# Patient Record
Sex: MALE | Race: WHITE | Employment: FULL TIME | URBAN - METROPOLITAN AREA
[De-identification: names, ages, dates, MRNs, and addresses within clinical notes are randomized per-mention and may not be internally consistent; named-entity substitution may affect disease eponyms.]

---

## 2021-04-08 ENCOUNTER — HOSPITAL ENCOUNTER (OUTPATIENT)
Dept: GENERAL RADIOLOGY | Age: 56
Discharge: HOME | End: 2021-04-08
Payer: COMMERCIAL

## 2021-04-08 ENCOUNTER — TRANSCRIBE ORDER (OUTPATIENT)
Dept: REGISTRATION | Age: 56
End: 2021-04-08

## 2021-04-08 DIAGNOSIS — R07.89 OTHER CHEST PAIN: ICD-10-CM

## 2021-04-08 DIAGNOSIS — R94.39 ABNORMAL BALLISTOCARDIOGRAM: ICD-10-CM

## 2021-04-08 DIAGNOSIS — R94.39 ABNORMAL BALLISTOCARDIOGRAM: Primary | ICD-10-CM

## 2021-04-08 PROCEDURE — 71046 X-RAY EXAM CHEST 2 VIEWS: CPT

## 2021-04-09 ENCOUNTER — APPOINTMENT (OUTPATIENT)
Dept: VASCULAR SURGERY | Age: 56
End: 2021-04-09
Attending: PHYSICIAN ASSISTANT
Payer: COMMERCIAL

## 2021-04-09 ENCOUNTER — APPOINTMENT (OUTPATIENT)
Dept: NON INVASIVE DIAGNOSTICS | Age: 56
End: 2021-04-09
Attending: PHYSICIAN ASSISTANT
Payer: COMMERCIAL

## 2021-04-09 ENCOUNTER — APPOINTMENT (OUTPATIENT)
Dept: GENERAL RADIOLOGY | Age: 56
End: 2021-04-09
Attending: PHYSICIAN ASSISTANT
Payer: COMMERCIAL

## 2021-04-09 ENCOUNTER — HOSPITAL ENCOUNTER (OUTPATIENT)
Age: 56
Discharge: HOME | End: 2021-04-09
Attending: INTERNAL MEDICINE | Admitting: INTERNAL MEDICINE
Payer: COMMERCIAL

## 2021-04-09 VITALS
WEIGHT: 197.97 LBS | BODY MASS INDEX: 28.34 KG/M2 | HEIGHT: 70 IN | OXYGEN SATURATION: 96 % | RESPIRATION RATE: 18 BRPM | TEMPERATURE: 98.6 F | SYSTOLIC BLOOD PRESSURE: 128 MMHG | DIASTOLIC BLOOD PRESSURE: 75 MMHG | HEART RATE: 82 BPM

## 2021-04-09 DIAGNOSIS — Z01.810 PREOP CARDIOVASCULAR EXAM: ICD-10-CM

## 2021-04-09 DIAGNOSIS — R07.9 CHEST PAIN, UNSPECIFIED TYPE: ICD-10-CM

## 2021-04-09 DIAGNOSIS — I65.23 BILATERAL CAROTID ARTERY STENOSIS: ICD-10-CM

## 2021-04-09 PROBLEM — R94.39 CARDIOVASCULAR STRESS TEST ABNORMAL: Status: ACTIVE | Noted: 2021-04-09

## 2021-04-09 PROBLEM — I25.118 CORONARY ARTERY DISEASE OF NATIVE ARTERY OF NATIVE HEART WITH STABLE ANGINA PECTORIS (HCC): Status: ACTIVE | Noted: 2021-04-09

## 2021-04-09 LAB
ALBUMIN SERPL-MCNC: 3.7 G/DL (ref 3.5–5)
ALBUMIN/GLOB SERPL: 1.4 {RATIO} (ref 1.1–2.2)
ALP SERPL-CCNC: 34 U/L (ref 45–117)
ALT SERPL-CCNC: 38 U/L (ref 12–78)
ANION GAP SERPL CALC-SCNC: 5 MMOL/L (ref 5–15)
ANION GAP SERPL CALC-SCNC: 6 MMOL/L (ref 5–15)
APPEARANCE UR: CLEAR
ARTERIAL PATENCY WRIST A: YES
AST SERPL-CCNC: 14 U/L (ref 15–37)
BACTERIA URNS QL MICRO: NEGATIVE /HPF
BASE EXCESS BLD CALC-SCNC: 0 MMOL/L
BASOPHILS # BLD: 0.1 K/UL (ref 0–0.1)
BASOPHILS NFR BLD: 1 % (ref 0–1)
BDY SITE: ABNORMAL
BILIRUB SERPL-MCNC: 0.6 MG/DL (ref 0.2–1)
BILIRUB UR QL: NEGATIVE
BUN SERPL-MCNC: 15 MG/DL (ref 6–20)
BUN SERPL-MCNC: 18 MG/DL (ref 6–20)
BUN/CREAT SERPL: 15 (ref 12–20)
BUN/CREAT SERPL: 21 (ref 12–20)
CA-I BLD-SCNC: 1.3 MMOL/L (ref 1.12–1.32)
CALCIUM SERPL-MCNC: 8.5 MG/DL (ref 8.5–10.1)
CALCIUM SERPL-MCNC: 8.9 MG/DL (ref 8.5–10.1)
CHLORIDE SERPL-SCNC: 108 MMOL/L (ref 97–108)
CHLORIDE SERPL-SCNC: 108 MMOL/L (ref 97–108)
CO2 SERPL-SCNC: 26 MMOL/L (ref 21–32)
CO2 SERPL-SCNC: 28 MMOL/L (ref 21–32)
COLOR UR: ABNORMAL
COMMENT, HOLDF: NORMAL
CREAT SERPL-MCNC: 0.87 MG/DL (ref 0.7–1.3)
CREAT SERPL-MCNC: 0.97 MG/DL (ref 0.7–1.3)
DIFFERENTIAL METHOD BLD: ABNORMAL
ECHO AO ASC DIAM: 3.55 CM
ECHO AV AREA PEAK VELOCITY: 3.3 CM2
ECHO AV AREA/BSA PEAK VELOCITY: 1.6 CM2/M2
ECHO AV PEAK GRADIENT: 4.92 MMHG
ECHO AV PEAK VELOCITY: 110.94 CM/S
ECHO LA TO AORTIC ROOT RATIO: 1.09
ECHO LV E' LATERAL VELOCITY: 12.16 CM/S
ECHO LV E' SEPTAL VELOCITY: 6.59 CM/S
ECHO LV INTERNAL DIMENSION DIASTOLIC: 4.23 CM (ref 4.2–5.9)
ECHO LV INTERNAL DIMENSION SYSTOLIC: 2.69 CM
ECHO LV IVSD: 0.93 CM (ref 0.6–1)
ECHO LV MASS 2D: 121.9 G (ref 88–224)
ECHO LV MASS INDEX 2D: 58.6 G/M2 (ref 49–115)
ECHO LV POSTERIOR WALL DIASTOLIC: 0.89 CM (ref 0.6–1)
ECHO LVOT DIAM: 2.29 CM
ECHO LVOT PEAK GRADIENT: 3.19 MMHG
ECHO LVOT PEAK VELOCITY: 89.35 CM/S
ECHO MV A VELOCITY: 79.36 CM/S
ECHO MV E DECELERATION TIME (DT): 280.37 MS
ECHO MV E VELOCITY: 64.16 CM/S
ECHO MV E/A RATIO: 0.81
ECHO MV E/E' LATERAL: 5.28
ECHO MV E/E' RATIO (AVERAGED): 7.51
ECHO MV E/E' SEPTAL: 9.74
ECHO PV PEAK INSTANTANEOUS GRADIENT SYSTOLIC: 1.44 MMHG
ECHO PV REGURGITANT MAX VELOCITY: 60.03 CM/S
ECHO RV INTERNAL DIMENSION: 3.19 CM
EOSINOPHIL # BLD: 0 K/UL (ref 0–0.4)
EOSINOPHIL NFR BLD: 0 % (ref 0–7)
EPITH CASTS URNS QL MICRO: ABNORMAL /LPF
ERYTHROCYTE [DISTWIDTH] IN BLOOD BY AUTOMATED COUNT: 12.4 % (ref 11.5–14.5)
GAS FLOW.O2 O2 DELIVERY SYS: ABNORMAL L/MIN
GLOBULIN SER CALC-MCNC: 2.7 G/DL (ref 2–4)
GLUCOSE SERPL-MCNC: 113 MG/DL (ref 65–100)
GLUCOSE SERPL-MCNC: 188 MG/DL (ref 65–100)
GLUCOSE UR STRIP.AUTO-MCNC: 100 MG/DL
HCO3 BLD-SCNC: 24.6 MMOL/L (ref 22–26)
HCT VFR BLD AUTO: 43.2 % (ref 36.6–50.3)
HGB BLD-MCNC: 14.9 G/DL (ref 12.1–17)
HGB UR QL STRIP: NEGATIVE
HYALINE CASTS URNS QL MICRO: ABNORMAL /LPF (ref 0–5)
IMM GRANULOCYTES # BLD AUTO: 0.1 K/UL (ref 0–0.04)
IMM GRANULOCYTES NFR BLD AUTO: 1 % (ref 0–0.5)
KETONES UR QL STRIP.AUTO: NEGATIVE MG/DL
LEFT CCA DIST DIAS: 27.8 CM/S
LEFT CCA DIST SYS: 77.5 CM/S
LEFT CCA PROX DIAS: 27.8 CM/S
LEFT CCA PROX SYS: 88.4 CM/S
LEFT ECA DIAS: 13.8 CM/S
LEFT ECA SYS: 66.7 CM/S
LEFT ICA DIST DIAS: 35.6 CM/S
LEFT ICA DIST SYS: 79.1 CM/S
LEFT ICA MID DIAS: 32.5 CM/S
LEFT ICA MID SYS: 71.3 CM/S
LEFT ICA PROX DIAS: 20 CM/S
LEFT ICA PROX SYS: 55.8 CM/S
LEFT ICA/CCA SYS: 1
LEFT SUBCLAVIAN DIAS: 10.7 CM/S
LEFT SUBCLAVIAN SYS: 133.5 CM/S
LEFT VERTEBRAL DIAS: 10.7 CM/S
LEFT VERTEBRAL SYS: 37.1 CM/S
LEUKOCYTE ESTERASE UR QL STRIP.AUTO: NEGATIVE
LYMPHOCYTES # BLD: 1.8 K/UL (ref 0.8–3.5)
LYMPHOCYTES NFR BLD: 28 % (ref 12–49)
MAGNESIUM SERPL-MCNC: 2.1 MG/DL (ref 1.6–2.4)
MCH RBC QN AUTO: 30.4 PG (ref 26–34)
MCHC RBC AUTO-ENTMCNC: 34.5 G/DL (ref 30–36.5)
MCV RBC AUTO: 88.2 FL (ref 80–99)
MONOCYTES # BLD: 0.6 K/UL (ref 0–1)
MONOCYTES NFR BLD: 9 % (ref 5–13)
NEUTS SEG # BLD: 3.8 K/UL (ref 1.8–8)
NEUTS SEG NFR BLD: 61 % (ref 32–75)
NITRITE UR QL STRIP.AUTO: NEGATIVE
NRBC # BLD: 0 K/UL (ref 0–0.01)
NRBC BLD-RTO: 0 PER 100 WBC
PCO2 BLD: 40.1 MMHG (ref 35–45)
PH BLD: 7.4 [PH] (ref 7.35–7.45)
PH UR STRIP: 7 [PH] (ref 5–8)
PLATELET # BLD AUTO: 188 K/UL (ref 150–400)
PMV BLD AUTO: 10.5 FL (ref 8.9–12.9)
PO2 BLD: 77 MMHG (ref 80–100)
POTASSIUM SERPL-SCNC: 3.7 MMOL/L (ref 3.5–5.1)
POTASSIUM SERPL-SCNC: 4.4 MMOL/L (ref 3.5–5.1)
PROT SERPL-MCNC: 6.4 G/DL (ref 6.4–8.2)
PROT UR STRIP-MCNC: NEGATIVE MG/DL
RBC # BLD AUTO: 4.9 M/UL (ref 4.1–5.7)
RBC #/AREA URNS HPF: ABNORMAL /HPF (ref 0–5)
RIGHT CCA DIST DIAS: 24.7 CM/S
RIGHT CCA DIST SYS: 86.9 CM/S
RIGHT CCA PROX DIAS: 21.6 CM/S
RIGHT CCA PROX SYS: 93.1 CM/S
RIGHT ECA DIAS: 13.8 CM/S
RIGHT ECA SYS: 76 CM/S
RIGHT ICA DIST DIAS: 43.3 CM/S
RIGHT ICA DIST SYS: 88.4 CM/S
RIGHT ICA MID DIAS: 38.7 CM/S
RIGHT ICA MID SYS: 79.1 CM/S
RIGHT ICA PROX DIAS: 27.8 CM/S
RIGHT ICA PROX SYS: 57.3 CM/S
RIGHT ICA/CCA SYS: 1
RIGHT SUBCLAVIAN DIAS: 10.7 CM/S
RIGHT SUBCLAVIAN SYS: 110.2 CM/S
RIGHT VERTEBRAL DIAS: 15.4 CM/S
RIGHT VERTEBRAL SYS: 44.9 CM/S
SAMPLES BEING HELD,HOLD: NORMAL
SAO2 % BLD: 95 % (ref 92–97)
SODIUM SERPL-SCNC: 140 MMOL/L (ref 136–145)
SODIUM SERPL-SCNC: 141 MMOL/L (ref 136–145)
SP GR UR REFRACTOMETRY: 1.01 (ref 1–1.03)
SPECIMEN TYPE: ABNORMAL
TSH SERPL DL<=0.05 MIU/L-ACNC: 0.57 UIU/ML (ref 0.36–3.74)
UA: UC IF INDICATED,UAUC: ABNORMAL
UROBILINOGEN UR QL STRIP.AUTO: 0.2 EU/DL (ref 0.2–1)
WBC # BLD AUTO: 6.2 K/UL (ref 4.1–11.1)
WBC URNS QL MICRO: ABNORMAL /HPF (ref 0–4)

## 2021-04-09 PROCEDURE — 36600 WITHDRAWAL OF ARTERIAL BLOOD: CPT

## 2021-04-09 PROCEDURE — C1769 GUIDE WIRE: HCPCS | Performed by: INTERNAL MEDICINE

## 2021-04-09 PROCEDURE — 84443 ASSAY THYROID STIM HORMONE: CPT

## 2021-04-09 PROCEDURE — 99152 MOD SED SAME PHYS/QHP 5/>YRS: CPT | Performed by: INTERNAL MEDICINE

## 2021-04-09 PROCEDURE — 74011000250 HC RX REV CODE- 250: Performed by: INTERNAL MEDICINE

## 2021-04-09 PROCEDURE — 81001 URINALYSIS AUTO W/SCOPE: CPT

## 2021-04-09 PROCEDURE — 80053 COMPREHEN METABOLIC PANEL: CPT

## 2021-04-09 PROCEDURE — C1894 INTRO/SHEATH, NON-LASER: HCPCS | Performed by: INTERNAL MEDICINE

## 2021-04-09 PROCEDURE — 99205 OFFICE O/P NEW HI 60 MIN: CPT | Performed by: THORACIC SURGERY (CARDIOTHORACIC VASCULAR SURGERY)

## 2021-04-09 PROCEDURE — 93880 EXTRACRANIAL BILAT STUDY: CPT | Performed by: PSYCHIATRY & NEUROLOGY

## 2021-04-09 PROCEDURE — 71046 X-RAY EXAM CHEST 2 VIEWS: CPT

## 2021-04-09 PROCEDURE — 93458 L HRT ARTERY/VENTRICLE ANGIO: CPT | Performed by: INTERNAL MEDICINE

## 2021-04-09 PROCEDURE — 74011250636 HC RX REV CODE- 250/636: Performed by: INTERNAL MEDICINE

## 2021-04-09 PROCEDURE — 93970 EXTREMITY STUDY: CPT

## 2021-04-09 PROCEDURE — 74011000636 HC RX REV CODE- 636: Performed by: INTERNAL MEDICINE

## 2021-04-09 PROCEDURE — 77030028837 HC SYR ANGI PWR INJ COEU -A: Performed by: INTERNAL MEDICINE

## 2021-04-09 PROCEDURE — 36415 COLL VENOUS BLD VENIPUNCTURE: CPT

## 2021-04-09 PROCEDURE — 93922 UPR/L XTREMITY ART 2 LEVELS: CPT

## 2021-04-09 PROCEDURE — 93880 EXTRACRANIAL BILAT STUDY: CPT

## 2021-04-09 PROCEDURE — 99153 MOD SED SAME PHYS/QHP EA: CPT | Performed by: INTERNAL MEDICINE

## 2021-04-09 PROCEDURE — 83735 ASSAY OF MAGNESIUM: CPT

## 2021-04-09 PROCEDURE — 85025 COMPLETE CBC W/AUTO DIFF WBC: CPT

## 2021-04-09 PROCEDURE — 93306 TTE W/DOPPLER COMPLETE: CPT

## 2021-04-09 PROCEDURE — 82803 BLOOD GASES ANY COMBINATION: CPT

## 2021-04-09 RX ORDER — SODIUM CHLORIDE 9 MG/ML
100 INJECTION, SOLUTION INTRAVENOUS CONTINUOUS
Status: DISPENSED | OUTPATIENT
Start: 2021-04-09 | End: 2021-04-09

## 2021-04-09 RX ORDER — LIDOCAINE HYDROCHLORIDE 10 MG/ML
INJECTION INFILTRATION; PERINEURAL AS NEEDED
Status: DISCONTINUED | OUTPATIENT
Start: 2021-04-09 | End: 2021-04-09 | Stop reason: HOSPADM

## 2021-04-09 RX ORDER — METOPROLOL SUCCINATE 25 MG/1
25 TABLET, EXTENDED RELEASE ORAL DAILY
Qty: 30 TAB | Refills: 5 | Status: SHIPPED | OUTPATIENT
Start: 2021-04-09

## 2021-04-09 RX ORDER — GUAIFENESIN 100 MG/5ML
81 LIQUID (ML) ORAL DAILY
COMMUNITY

## 2021-04-09 RX ORDER — SODIUM CHLORIDE 0.9 % (FLUSH) 0.9 %
5-40 SYRINGE (ML) INJECTION EVERY 8 HOURS
Status: DISCONTINUED | OUTPATIENT
Start: 2021-04-09 | End: 2021-04-09 | Stop reason: HOSPADM

## 2021-04-09 RX ORDER — ATORVASTATIN CALCIUM 40 MG/1
40 TABLET, FILM COATED ORAL DAILY
Qty: 30 TAB | Refills: 5 | Status: SHIPPED | OUTPATIENT
Start: 2021-04-09

## 2021-04-09 RX ORDER — FENTANYL CITRATE 50 UG/ML
INJECTION, SOLUTION INTRAMUSCULAR; INTRAVENOUS AS NEEDED
Status: DISCONTINUED | OUTPATIENT
Start: 2021-04-09 | End: 2021-04-09 | Stop reason: HOSPADM

## 2021-04-09 RX ORDER — NITROGLYCERIN 0.4 MG/1
0.4 TABLET SUBLINGUAL
Qty: 1 BOTTLE | Refills: 5 | Status: SHIPPED | OUTPATIENT
Start: 2021-04-09

## 2021-04-09 RX ORDER — HEPARIN SODIUM 200 [USP'U]/100ML
INJECTION, SOLUTION INTRAVENOUS
Status: COMPLETED | OUTPATIENT
Start: 2021-04-09 | End: 2021-04-09

## 2021-04-09 RX ORDER — SODIUM CHLORIDE 0.9 % (FLUSH) 0.9 %
5-40 SYRINGE (ML) INJECTION AS NEEDED
Status: DISCONTINUED | OUTPATIENT
Start: 2021-04-09 | End: 2021-04-09 | Stop reason: HOSPADM

## 2021-04-09 RX ORDER — ACETAMINOPHEN 325 MG/1
650 TABLET ORAL
Status: DISCONTINUED | OUTPATIENT
Start: 2021-04-09 | End: 2021-04-09 | Stop reason: HOSPADM

## 2021-04-09 RX ORDER — MIDAZOLAM HYDROCHLORIDE 1 MG/ML
INJECTION, SOLUTION INTRAMUSCULAR; INTRAVENOUS AS NEEDED
Status: DISCONTINUED | OUTPATIENT
Start: 2021-04-09 | End: 2021-04-09 | Stop reason: HOSPADM

## 2021-04-09 NOTE — CONSULTS
CSS Consult    Subjective:      Kp Alves. is a 60 y.o. male who was referred for cardiac evaluation from Dr. Yadira Euceda. Patient has a chief complaint of chest pain and shortness of breath. He is usually quite active and runs. Patient has noted progressive chest pain over the past year and a half, but noted he thinks he may have had chest pain a few years ago when he was running the [address]. He noticed chest tightness when running a quarter to half a mile at first, but now has shortness of breath and chest tightness when walking up a hill in his backyard. Patient subsequently saw Dr. Yadira Euceda who did a stress test that was abnormal and he was scheduled for an outpatient cardiac cath. Past medical history significant for varicose veins with bilateral vein stripping, BPH,  hyperlipidemia and hypertension. Never smoker and drinks socially. Father with MI at 78. Cardiac Testing    Cardiac catheterization:  1- severe 3 vessel CAD  2- preserved LV systolic fxn  3- Nl LVEDP    Past Medical History:   Diagnosis Date    BPH (benign prostatic hyperplasia)     Hyperlipidemia     Hypertension     Varicose veins of both lower extremities     S/P bilateral greater saphenous vein stripping     Past Surgical History:   Procedure Laterality Date    HX CARPAL TUNNEL RELEASE      HX HERNIA REPAIR      HX VEIN STRIPPING      B/L GSV      Social History     Tobacco Use    Smoking status: Never Smoker   Substance Use Topics    Alcohol use: Yes     Frequency: 2-4 times a month      Family History   Problem Relation Age of Onset    Coronary Artery Disease Father 78        MI     Prior to Admission medications    Medication Sig Start Date End Date Taking? Authorizing Provider   aspirin 81 mg chewable tablet Take 81 mg by mouth daily. Yes Provider, Historical       Not on File    Review of Systems:   Consititutional: Denies fever or chills.   Eyes:  Denies use of glasses or vision problems(cataracts). ENT:  Denies hearing or swallowing difficulty. CV: + CP, -claudication, +HTN. Resp: + dyspnea, -productive cough. : Denies dialysis or kidney problems. GI: Denies ulcers, esophageal strictures, liver problems. M/S: Denies joint or bone problems, or implanted artificial hardware. Skin: Denies varicose veins, edema. Neuro: Denies strokes, or TIAs. Psych: Denies anxiety or depression. Endocrine: Denies thyroid problems or diabetes. Heme/Lymphatic: Denies easy bruising or lymphedema. Objective:     Visit Vitals  /80   Pulse 68   Temp 98.6 °F (37 °C)   Resp 11   Ht 5' 10\" (1.778 m)   Wt 198 lb (89.8 kg)   SpO2 94%   BMI 28.41 kg/m²     Physical Exam:    General appearance: alert, cooperative, no distress  Head: normocephalic, without obvious abnormality; atraumatic  Eyes: conjunctivae/corneas clear; EOM's intact. Nose: nares normal; no drainage. Neck: no carotid bruit and no JVD  Lungs: clear to auscultation bilaterally  Heart: regular rate and rhythm; no murmur  Abdomen: soft, non-tender; bowel sounds normal  Extremities: moves all extremities; no weakness. Skin: Skin color normal; No varicose veins or edema. Neurologic: Grossly normal      Labs:   Recent Labs     04/09/21  0947   WBC 6.2   HGB 14.9   HCT 43.2         K 4.4   BUN 18   CREA 0.87   *       Diagnostics:   PA and lateral:  Ordered    Carotid doppler:   Ordered    PFTS-FEV1:   NA    EKG:   Ordered    Assessment:     Active Problems:    Coronary artery disease of native artery of native heart with stable angina pectoris ([facility] [address] (4/9/2021)        Plan:   The risk and benefit of surgery were reviewed with patient and family and all questions answered and the patient wishes to proceed. Risk include infection, bleeding, stroke, heart attack, irregular heart rhythm, kidney failure and death. Surgery is scheduled for 4-23-21. STS Risk Calculator V2.81 - Discussed by surgeon with patient. Risk of Mortality: 0.344% Renal Failure: 0.402% Permanent Stroke: 0.540% Prolonged Ventilation: 1.958% DSW Infection: 0.119% Reoperation: 1.579% Morbidity or Mortality: 3.522% Short Length of Stay: 74.230% Long Length of Stay: 0.977%    Treatment Plan:    1. CAD with stable angina: Plan for CABG 4-23-21. Preop workup ordered. Please complete orders prior to patient discharge. 2. Hypertension: Not on medicine currently  3. Hyperlipidemia: On statin  4. Hx varicose veins with vein stripping: Vein mapping and Cristian's test ordered  5.  BPH: On flomax      Signed By: CHRISTIANA Moreno     April 9, 2021

## 2021-04-09 NOTE — Clinical Note
Sheath #1: Closed using manual compression and Hemostasis Pad. Site secured by Tegaderm. Pressure held for: 10 minutes.

## 2021-04-09 NOTE — PROGRESS NOTES
Discharge instructions reviewed with patient and wife. Rx given by Dr. Kajal Acosta. Precautions and restrictions were discussed. IV and telemetry removed. Opportunity to ask questions provided and answered.  Taken down in wheelchair by [address]

## 2021-04-09 NOTE — DISCHARGE INSTRUCTIONS
Cardiology Discharge Summary     Patient ID:  Pastora Hsu  [id number]  60 y.o.  1965    Admit Date: 4/9/2021    Discharge Date: 4/9/2021     Admitting Physician: Rosangela Field MD     Patient Instructions:       Referenced discharge instructions provided by nursing for diet and activity. Follow-up with Dr Klever Hare as directed for bypass surgery     Signed:  Rosangela Field MD  4/9/2021  5:57 PM   Cardiac Catheterization  Discharge Instructions        Do not drive, operate any machinery, or sign any legal documents for 24 hours after your procedure. You must have someone to drive you home.  You may take a shower 24 hours after your cardiac catheterization. Be sure to get the dressing wet and then remove it; gently wash the area with warm soapy water. Pat dry and leave open to air. To help prevent infections, be sure to keep the cath site clean and dry. No lotions, creams, powders, ointments, etc. in the cath site for approximately 1 week.  Do not take a tub bath, get in a hot tub or swimming pool for approximately 5 days or until the cath site is completely healed.  No strenuous activity or heavy lifting over 10 lbs. for  2 days.  Drink plenty of fluids for 24-48 hours after your cath to flush the contrast dye from your kidneys. No alcoholic beverages for 24 hours. You may resume your previous diet after your cath.  After your cath, some bruising or discomfort is common during the healing process. Tylenol, 1-2 tablets every 6 hours as needed, is recommended if you experience any discomfort. If you experience any signs or symptoms of infection such as fever, chills, or poorly healing incision, persistent tenderness or swelling in the groin, redness and/or warmth to the touch, numbness, significant tingling or pain at the groin site or affected extremity, rash, drainage from the cath site, or if the leg feels tight or swollen, call your physician right away.      If bleeding at the cath site occurs, take a clean gauze pad and apply direct pressure to the groin just above the puncture site. Call 911 immediately, and continue to apply direct pressure until an ambulance gets to your location. Preeti Law       Nurse Signature Date      [address] [facility] [address]   [phone]  [address]    [email or web address]

## 2021-04-09 NOTE — PROGRESS NOTES
LHC, cor angio, LV gram completed s complications   [place]    Findings    1- severe 3 vessel CAD  2- preserved LV systolic fxn  3- Nl LVEDP    Recc    CT surgery consult for CABG

## 2021-04-09 NOTE — PROGRESS NOTES
Cardiac Surgery Care Coordinator-  Met with Florian Virgil. and wife. Introduced role of the Cardiac Surgery Care Coordinator. Reviewed plan of care and began pre-op education. Discussed day of surgery expectations for the pt and family. Reviewed material in the CABG educational folder including Cardiac Surgery Pathway. Reinforced sternal precautions and keeping your move in the tube. Encouraged Florian Virgil. and his family to verbalize and offered emotional support.  Martin Gordon RN

## 2021-04-09 NOTE — CARDIO/PULMONARY
Cardiac Rehab Note: chart review/referral     Pt is a 61 yo admitted with chest pain, s/p cardiac cath with severe 3 vessel disease, seen by CTS, CABG planned for 4/23/21. Smoking history assessed. Patient is a never smoker. Smoking Cessation Program link has not been added to the AVS.     EF echo results pending. CABG educational folder with \"Cardiac Surgery Post-Op Instructions\" book, heart healthy eating, warning signs, heart facts, heart aware, resources, and CABG Surgery booklet to Asuncion Hutson. on 4/9/21. Pt encouraged to bring folder back to hospital with him on day of surgery. Educated using teach back method. Assessed patient's understanding of diagnosis and upcoming surgery. Reviewed general pre-op instructions including the need for thermometer and scale post-op, use of incentive spirometer, understanding of pain scale, and answered general post-surgery questions. Asuncion Hutson. was instructed on use of incentive spirometer. Discussed risk factors for CAD to include the following: family history, elevated BMI, hyperlipidemia, hypertension, diabetes, and stress. Encouraged patient to consider participation in a Cardiac Rehab Program, after discharge, to assist with their risk modification and management. Asuncion Hutson. verbalized understanding with questions answered. CP Rehab will continue to follow and educate as indicated.

## 2021-04-09 NOTE — Clinical Note
TRANSFER - OUT REPORT:     Verbal report given to: Manuel Gordon RN. Report consisted of patient's Situation, Background, Assessment and   Recommendations(SBAR). Opportunity for questions and clarification was provided. Patient transported with a Registered Nurse. [facility]

## 2021-04-10 NOTE — CONSULTS
[facility]    Name:  Nathanael Cat  MR#:  [id number]   :  1965  ACCOUNT #:  [de-identified]  DATE OF SERVICE:  2021    HISTORY OF PRESENT ILLNESS:  The patient is a 61-year-old male, who is referred for coronary artery bypass grafting by Dr. Jakob Riley. He has a chief complaint of chest pain and shortness of breath with exertion. He is normally quite active and runs long distances. He had noted worsening chest pain over the last year and a half, and he also noted chest pains while  few years ago. He was subsequently seen by Dr. Jakob Riley, who did a stress test, which is abnormal, and was then scheduled for an elective catheterization, which showed a proximal LAD lesion as well as a diagonal lesion, a RCA lesion, and an ostial circumflex lesion. PERTINENT CARDIAC TESTING:  Cardiac catheterization had showed severe three-vessel disease and normal left ventricular function, normal LVEDP. Echocardiogram revealed mitral valve thickening, but no significant regurgitation and a normal EF. PAST MEDICAL HISTORY:  Diagnosis of BPH, hyperlipidemia, hypertension, and varicose vein stripping of bilateral lower extremities. PAST SURGICAL HISTORY:  History of carpal tunnel release, history of hernia repair, history of bilateral vein striping. SOCIAL HISTORY:  Tobacco use:  He is a never smoker. Alcohol use:  Yes, he drinks alcohol 2-4 times a month. FAMILY HISTORY:  He had a father with coronary artery disease and MI at age 78. MEDICATIONS PRIOR TO ADMISSION:  Include aspirin 81 mg. REVIEW OF SYSTEMS:  Pertinent to the HPI. PHYSICAL EXAMINATION:  GENERAL:  He is alert and oriented, and normal body habitus. VITAL SIGNS:  Blood pressure 122/80, pulse of 68, temperature 98.6, respirations 11, height 5 foot 10 inches, weight is 198, saturation of 94%, and BMI of 28.4. LUNGS:  Clear to auscultation.   HEART:  A regular rate and rhythm, and no murmur. ABDOMEN:  Soft, nontender, nondistended. He has normal bowel sounds. EXTREMITIES:  Normal range of motion. Normal development. SKIN:  No edema and no obvious varicose veins and normal skin color. NEUROLOGIC:  He is grossly normal on his neurologic exam.    LABORATORY DATA:  Lab analysis revealed a normal creatinine at 0.87 and normal hemoglobin at 14.9. Other pertinent findings, his carotid duplex shows bilateral mild carotid stenosis. His modified Cristian's test shows a complete arch in both left and right arms. His vein mapping shows inadequate saphenous vein for bypass conduit. ASSESSMENT:  The patient is an otherwise healthy and low risk gentleman for coronary artery bypass grafting. He needs a bypass to his posterior descending artery, his diagonal, and his left anterior descending artery. His circumflex appears too small to bypass. PLAN:  We have reviewed the risks and benefits of the surgery with him and he agreed to proceed. We have scheduled him for an elective coronary artery bypass grafting operation for the end of this month. We will plan to place a LIMA to his LAD and radial artery to his diagonal artery. For his PDA, we would have to either use a free KELLI, a remnant of a vein graft which I do not think would be usable given his vein striping or we leave it un-revascularized and adjust it with PCI at a later date. I will discuss the remainder of that plan with Dr. Rl Gutierrez.       MD SOTERO [id number]  D:  04/10/2021 12:19  T:  04/10/2021 18:17  JOB #:  [id number]

## 2021-04-11 LAB
LEFT 3RD DIGIT BP: 150 MMHG
LEFT 3RD DIGIT COMP BP: 145 MMHG
LEFT GSV ANKLE DIAM: 0.27 CM
LEFT GSV AT KNEE DIAM: 0.26 CM
LEFT GSV BK MID DIAM: 0.29 CM
LEFT GSV BK PROX DIAM: 0.41 CM
LEFT GSV THIGH DIST DIAM: 0.29 CM
LEFT GSV THIGH MID DIAM: 0.28 CM
RIGHT 3RD DIGIT BP: 129 MMHG
RIGHT 3RD DIGIT COMP BP: 134 MMHG
RIGHT GSV ANKLE DIAM: 0.19 CM
RIGHT GSV AT KNEE DIAM: 0.32 CM
RIGHT GSV BK MID DIAM: 0.22 CM
RIGHT GSV BK PROX DIAM: 0.38 CM
RIGHT GSV THIGH DIST DIAM: 0.21 CM
RIGHT GSV THIGH MID DIAM: 0.2 CM

## 2021-04-12 NOTE — PROCEDURES
[facility] CATH    Name:  Jazlyn Thomson  MR#:  [id number]  :  1965  ACCOUNT #:  [de-identified]  DATE OF SERVICE:  2021    PROCEDURES PERFORMED:  Left heart catheterization, coronary angiography, left ventriculography. :  Tomas Kathleen MD    ESTIMATED BLOOD LOSS:  Less than 20 mL. SPECIMENS REMOVED:  None. COMPLICATIONS:  None. ANESTHESIA:  Local with conscious sedation. INDICATION:  Angina, abnormal stress nuclear showing myocardial ischemia. TECHNIQUE:  Right femoral artery via Luis Armando. CATHETERS USED:  5-Georgian JL-4, 5-Georgian JR-4, 5-Georgian pigtail. MEDICATIONS USED:  Please see the separate cath lab log sheet. FINDINGS  1. Hemodynamics. The aortic pressure was 123/72 with a mean of 92. Left ventricular pressure was 110/0 with an LVEDP of 10. There was no significant gradient on pullback across the aortic valve. 2.  Left ventriculography done from the GROVES view revealed a normal-sized left ventricle with overall preserved systolic function. Estimated EF was 60%-65%. No regional wall motion abnormalities were seen. No significant mitral regurgitation was seen. 3.  Coronary angiography revealed a right-dominant system. The left main was a moderate-sized vessel without significant disease. The LAD was a long vessel wrapping around the apex. There was a 70% proximal stenosis just prior to the takeoff of a large branching first diagonal vessel. This had a 90% proximal stenosis before bifurcating into 2 large sub-branches. The superior sub-branch had a 90% proximal stenosis and the inferior sub-branch had a 95% proximal stenosis. The LAD distal to the takeoff of this first diagonal branch had a 70% mid stenosis. There was a moderate-sized second diagonal branch without significant disease. The remainder of the LAD had diffuse mild disease and wrapped around the apex.   The circumflex was a small vessel with a long area of 70% narrowing in the ostium and proximal portion of this vessel. It gave off one marginal branch. It was moderate in size without significant disease. The remainder of the AV groove circumflex was small and without significant disease. The right coronary artery was a large dominant vessel which bifurcated into a moderate-sized PDA and large posterolateral branch. The vessel had diffuse mild disease throughout its length and an 80% area of narrowing in the distal portion prior to the bifurcation into the PDA and posterolateral branch. The PDA was moderate in size and had no significant disease. The posterolateral branch was large and had multiple sub-branches without significant disease. CONCLUSIONS:  1. Three-vessel coronary artery disease as described above. 2.  Well-preserved LV systolic function. 3.  Normal resting LVEDP. RECOMMENDATIONS:  The films were reviewed with Dr. Rebekah Ly and was felt the patient was better suited as a candidate for coronary artery bypass grafting. Cardiovascular Surgery was subsequently consulted.       Janee Hernandez MD      [id number]  D:  04/12/2021 13:09  T:  04/12/2021 17:05  JOB #:  [id number]  CC:  Tomas Kathleen MD

## 2021-06-04 ENCOUNTER — APPOINTMENT (OUTPATIENT)
Dept: CARDIAC REHAB | Age: 56
End: 2021-06-04

## 2022-03-18 PROBLEM — R94.39 CARDIOVASCULAR STRESS TEST ABNORMAL: Status: ACTIVE | Noted: 2021-04-09

## 2022-03-19 PROBLEM — I25.118 CORONARY ARTERY DISEASE OF NATIVE ARTERY OF NATIVE HEART WITH STABLE ANGINA PECTORIS (HCC): Status: ACTIVE | Noted: 2021-04-09

## 2023-05-17 RX ORDER — ATORVASTATIN CALCIUM 40 MG/1
40 TABLET, FILM COATED ORAL DAILY
COMMUNITY
Start: 2021-04-09

## 2023-05-17 RX ORDER — METOPROLOL SUCCINATE 25 MG/1
25 TABLET, EXTENDED RELEASE ORAL DAILY
COMMUNITY
Start: 2021-04-09

## 2023-05-17 RX ORDER — NITROGLYCERIN 0.4 MG/1
0.4 TABLET SUBLINGUAL
COMMUNITY
Start: 2021-04-09

## 2023-05-17 RX ORDER — ASPIRIN 81 MG/1
81 TABLET, CHEWABLE ORAL DAILY
COMMUNITY

## 2024-11-18 ENCOUNTER — HOSPITAL ENCOUNTER (OUTPATIENT)
Facility: HOSPITAL | Age: 59
Discharge: HOME | End: 2024-11-21
Payer: COMMERCIAL

## 2024-11-18 DIAGNOSIS — N63.0 BREAST MASS IN MALE: ICD-10-CM

## 2024-11-18 PROCEDURE — 77066 DX MAMMO INCL CAD BI: CPT

## (undated) DEVICE — CATHETER ETER CARD MULTIPAK MULTIPAK 5FR PERFORMA

## (undated) DEVICE — PACK PROCEDURE SURG HRT CATH

## (undated) DEVICE — PINNACLE INTRODUCER SHEATH: Brand: PINNACLE

## (undated) DEVICE — SYR POWER 150ML 8IN FILL TUBE --

## (undated) DEVICE — KIT ANGIOGRAPHY CUST MRMC

## (undated) DEVICE — GUIDEWIRE VASC L145CM 0.035IN J TIP L3MM PTFE FIX COR NAMIC